# Patient Record
Sex: MALE | Race: OTHER
[De-identification: names, ages, dates, MRNs, and addresses within clinical notes are randomized per-mention and may not be internally consistent; named-entity substitution may affect disease eponyms.]

---

## 2019-02-27 ENCOUNTER — HOSPITAL ENCOUNTER (OUTPATIENT)
Dept: HOSPITAL 88 - OR | Age: 34
Discharge: HOME | End: 2019-02-27
Attending: UROLOGY
Payer: COMMERCIAL

## 2019-02-27 VITALS — SYSTOLIC BLOOD PRESSURE: 135 MMHG | DIASTOLIC BLOOD PRESSURE: 93 MMHG

## 2019-02-27 DIAGNOSIS — I10: ICD-10-CM

## 2019-02-27 DIAGNOSIS — N20.1: Primary | ICD-10-CM

## 2019-02-27 DIAGNOSIS — N13.30: ICD-10-CM

## 2019-02-27 PROCEDURE — 74018 RADEX ABDOMEN 1 VIEW: CPT

## 2019-02-27 PROCEDURE — 50590 FRAGMENTING OF KIDNEY STONE: CPT

## 2019-02-27 NOTE — DIAGNOSTIC IMAGING REPORT
Exam: KUB - 2 views



Clinical History: Pre-op for renal stone. 



Comparison: None.



Findings: 

Bowel gas partially obscures visualization of the kidneys. There is a 9 mm

calcification adjacent to the right L3 transverse process. There is a 3 mm

calcification projecting over the left mid kidney and a 3 mm calcification

projecting over the left lower kidney.



Nonobstructive bowel gas pattern. No acute osseous abnormality.



Impression:

A 9 mm calcification adjacent to the right L3 transverse process could

represent a ureteral stone in the appropriate clinical setting.



Calcifications measuring 3 mm overlying the left mid and lower kidney may

represent stones.



Signed by: Dr. Lori Romo MD on 2/27/2019 1:16 PM

## 2019-02-27 NOTE — OPERATIVE REPORT
DATE OF PROCEDURE:  02/27/2019

 

SURGEON:  Milton Boyd MD

 

PREOPERATIVE DIAGNOSIS:  Right ureteral calculus.

 

POSTOPERATIVE DIAGNOSIS:  Right ureteral calculus.

 

PROCEDURES:  

1. Staged right-sided shock-wave lithotripsy.

2. Supervision of fluoroscopy.

 

ANESTHESIA:  General.

 

ESTIMATED BLOOD LOSS:  Minimal.

 

COMPLICATIONS:  None.

 

INDICATIONS:  Mr. Peters is a very pleasant 33-year-old-male with a history of 
right

ureteral calculus.  He and I had a long discussion regarding the alternatives, 
the risks

and benefits, including doing nothing, stent placement, shock wave lithotripsy,

percutaneous surgery, open surgery.  The patient understands the options, 
alternatives,

the risks and benefits, and elected to proceed with shock-wave lithotripsy.  
Should this

fail will stent at later time. he had elected to proceed. 

 

PROCEDURE IN DETAIL:  After informed consent was obtained, the patient was taken
to the

operating suite, placed supine on the operating table, underwent general 
anesthesia by

the service.  The stone was localized in the xyz plane.  A total 3000 shocks 

were delivered to the stone.  The patient tolerated the procedure well and was

transported to the recovery room in excellent condition. 

 No complications noted

 

 

______________________________

Milton Boyd MD

 

ES/MODL

D:  02/27/2019 14:14:11

T:  02/27/2019 21:26:54

Job #:  [id number]

 

MTDD

## 2019-03-17 ENCOUNTER — HOSPITAL ENCOUNTER (INPATIENT)
Dept: HOSPITAL 88 - MED/SURG | Age: 34
LOS: 2 days | Discharge: HOME | DRG: 661 | End: 2019-03-19
Attending: UROLOGY | Admitting: UROLOGY
Payer: COMMERCIAL

## 2019-03-17 VITALS — SYSTOLIC BLOOD PRESSURE: 121 MMHG | DIASTOLIC BLOOD PRESSURE: 71 MMHG

## 2019-03-17 VITALS — BODY MASS INDEX: 29.25 KG/M2 | WEIGHT: 182.02 LBS | HEIGHT: 66 IN

## 2019-03-17 DIAGNOSIS — N23: ICD-10-CM

## 2019-03-17 DIAGNOSIS — R31.29: ICD-10-CM

## 2019-03-17 DIAGNOSIS — D72.829: ICD-10-CM

## 2019-03-17 DIAGNOSIS — I10: ICD-10-CM

## 2019-03-17 DIAGNOSIS — N13.2: Primary | ICD-10-CM

## 2019-03-17 DIAGNOSIS — I95.9: ICD-10-CM

## 2019-03-17 PROCEDURE — 80053 COMPREHEN METABOLIC PANEL: CPT

## 2019-03-17 PROCEDURE — 36415 COLL VENOUS BLD VENIPUNCTURE: CPT

## 2019-03-17 PROCEDURE — 74420 UROGRAPHY RTRGR +-KUB: CPT

## 2019-03-17 PROCEDURE — 85025 COMPLETE CBC W/AUTO DIFF WBC: CPT

## 2019-03-17 PROCEDURE — 87086 URINE CULTURE/COLONY COUNT: CPT

## 2019-03-18 VITALS — SYSTOLIC BLOOD PRESSURE: 117 MMHG | DIASTOLIC BLOOD PRESSURE: 63 MMHG

## 2019-03-18 VITALS — DIASTOLIC BLOOD PRESSURE: 91 MMHG | SYSTOLIC BLOOD PRESSURE: 137 MMHG

## 2019-03-18 VITALS — DIASTOLIC BLOOD PRESSURE: 83 MMHG | SYSTOLIC BLOOD PRESSURE: 138 MMHG

## 2019-03-18 VITALS — SYSTOLIC BLOOD PRESSURE: 121 MMHG | DIASTOLIC BLOOD PRESSURE: 71 MMHG

## 2019-03-18 VITALS — DIASTOLIC BLOOD PRESSURE: 74 MMHG | SYSTOLIC BLOOD PRESSURE: 111 MMHG

## 2019-03-18 LAB
ALBUMIN SERPL-MCNC: 3.6 G/DL (ref 3.5–5)
ALBUMIN/GLOB SERPL: 1.1 {RATIO} (ref 0.8–2)
ALP SERPL-CCNC: 60 IU/L (ref 40–150)
ALT SERPL-CCNC: 30 IU/L (ref 0–55)
ANION GAP SERPL CALC-SCNC: 11.5 MMOL/L (ref 8–16)
BASOPHILS # BLD AUTO: 0.1 10*3/UL (ref 0–0.1)
BASOPHILS NFR BLD AUTO: 0.4 % (ref 0–1)
BUN SERPL-MCNC: 17 MG/DL (ref 7–26)
BUN/CREAT SERPL: 14 (ref 6–25)
CALCIUM SERPL-MCNC: 8.7 MG/DL (ref 8.4–10.2)
CHLORIDE SERPL-SCNC: 107 MMOL/L (ref 98–107)
CO2 SERPL-SCNC: 27 MMOL/L (ref 22–29)
DEPRECATED NEUTROPHILS # BLD AUTO: 9.9 10*3/UL (ref 2.1–6.9)
EGFRCR SERPLBLD CKD-EPI 2021: > 60 ML/MIN (ref 60–?)
EOSINOPHIL # BLD AUTO: 0 10*3/UL (ref 0–0.4)
EOSINOPHIL NFR BLD AUTO: 0.3 % (ref 0–6)
ERYTHROCYTE [DISTWIDTH] IN CORD BLOOD: 12.4 % (ref 11.7–14.4)
GLOBULIN PLAS-MCNC: 3.3 G/DL (ref 2.3–3.5)
GLUCOSE SERPLBLD-MCNC: 103 MG/DL (ref 74–118)
HCT VFR BLD AUTO: 37.3 % (ref 38.2–49.6)
HGB BLD-MCNC: 12.6 G/DL (ref 14–18)
LYMPHOCYTES # BLD: 1.8 10*3/UL (ref 1–3.2)
LYMPHOCYTES NFR BLD AUTO: 14.4 % (ref 18–39.1)
MCH RBC QN AUTO: 29.9 PG (ref 28–32)
MCHC RBC AUTO-ENTMCNC: 33.8 G/DL (ref 31–35)
MCV RBC AUTO: 88.4 FL (ref 81–99)
MONOCYTES # BLD AUTO: 1 10*3/UL (ref 0.2–0.8)
MONOCYTES NFR BLD AUTO: 7.4 % (ref 4.4–11.3)
NEUTS SEG NFR BLD AUTO: 77.2 % (ref 38.7–80)
PLATELET # BLD AUTO: 337 X10E3/UL (ref 140–360)
POTASSIUM SERPL-SCNC: 4.5 MMOL/L (ref 3.5–5.1)
RBC # BLD AUTO: 4.22 X10E6/UL (ref 4.3–5.7)
SODIUM SERPL-SCNC: 141 MMOL/L (ref 136–145)

## 2019-03-18 RX ADMIN — CEFTRIAXONE SCH MLS/HR: 100 INJECTION, POWDER, FOR SOLUTION INTRAVENOUS at 17:39

## 2019-03-18 NOTE — NUR
ASSESSMENT DONE.AMBULATES.VOIDED.BLOODY URINE NOTED.RESTING COMFORTABLY IN THE BED.PHONE AND 
CALL LIGHT WITHIN REACH.

## 2019-03-18 NOTE — NUR
Patient started on PCA morphine. Bolus of 3mg given and patient tolerated well for pain 
level of 4 to abdomin.

## 2019-03-18 NOTE — NUR
Patient c/o nausea. Nothing ordered for nausea. Called Dr Boyd to get orders. Dr refused 
nausea med due to patients kidney status. Informed patient that Dr will be in to see him 
soon.

## 2019-03-19 VITALS — SYSTOLIC BLOOD PRESSURE: 109 MMHG | DIASTOLIC BLOOD PRESSURE: 60 MMHG

## 2019-03-19 VITALS — SYSTOLIC BLOOD PRESSURE: 124 MMHG | DIASTOLIC BLOOD PRESSURE: 80 MMHG

## 2019-03-19 VITALS — DIASTOLIC BLOOD PRESSURE: 97 MMHG | SYSTOLIC BLOOD PRESSURE: 152 MMHG

## 2019-03-19 PROCEDURE — BT141ZZ FLUOROSCOPY OF KIDNEYS, URETERS AND BLADDER USING LOW OSMOLAR CONTRAST: ICD-10-PCS | Performed by: UROLOGY

## 2019-03-19 PROCEDURE — 0T778ZZ DILATION OF LEFT URETER, VIA NATURAL OR ARTIFICIAL OPENING ENDOSCOPIC: ICD-10-PCS | Performed by: UROLOGY

## 2019-03-19 RX ADMIN — CEFTRIAXONE SCH MLS/HR: 100 INJECTION, POWDER, FOR SOLUTION INTRAVENOUS at 05:35

## 2019-03-19 NOTE — DISCHARGE SUMMARY
ADMITTING DIAGNOSIS:  Ureterolithiasis.

 

DISCHARGE DIAGNOSIS:  Ureterolithiasis.

 

PROCEDURES:  Cystoscopy, retrograde pyelogram, stent placement.

 

HOSPITAL COURSE:  The patient was admitted to the hospital.  History and physical

examination consisting of right renal colic, was found to have an obstructing 1 cm right

ureteral stone.  He underwent stent placement for postoperative pain control.

Concomitant factors were leukocytosis, hypotension, hypertension, colic, hematuria. 

 

DISPOSITION:  He is discharged to home on a regular diet.  Light activity.

 

DISCHARGE MEDICATIONS:  Per reconciliation sheet.

 

FOLLOWUP:  He will follow up in two weeks for definitive management of his ureteral

calculi. 

 

 

 

______________________________

MD RODNEY Carney/MODL

D:  03/19/2019 07:32:28

T:  03/19/2019 13:46:40

Job #:  [id number]

## 2019-03-19 NOTE — NUR
Patient discharged from facility to home. Patient assisted out via staff. Refused wheelchair 
assistance but staff ambulated with him. Reviewed discharge paperwork, follow up appts and 
RX's given.

## 2019-03-19 NOTE — OPERATIVE REPORT
DATE OF PROCEDURE:  03/18/2019

 

SURGEON:  Milton Boyd MD

 

PREOPERATIVE DIAGNOSES:  

1. Right-sided hydronephrosis.

2. Microscopic hematuria.

 

POSTOPERATIVE DIAGNOSES:  

1. Right-sided hydronephrosis.

2. Microscopic hematuria.

 

PROCEDURES:  

1. Cystourethroscopy with left ureteral catheterization and left retrograde 
pyelogram

(separate procedure for diagnosis of microscopic hematuria). 

2. Cystourethroscopy with insertion of right indwelling stent (entirely separate

procedure) for right hydronephrosis. 

3. Supervision on fluoroscopy.

4. Interpretation of retrograde pyelography.

 

ANESTHESIA:  General.

 

ESTIMATED BLOOD LOSS:  Minimal.

 

COMPLICATION:  None.

 

INDICATION:  Mr. Peters is a very pleasant 33-year-old male with a 1 cm 
obstructing

stone.  I had a long discussion about alternatives, risks, and benefits, 
including doing

nothing, stent placement, percutaneous surgery, and open surgery.  He voiced

understanding of the options, the alternatives, the risks, and benefits and 
elected to

proceed.  He voiced explicit understanding that stent is a temporary indwelling 
device

and it must be removed Failure to do so could lead to encrustation, infection, 
inflamation, atrophy loss of kidney renal failure even death.  He elects to 
proceed. 

 

PROCEDURE IN DETAIL:  After informed consent was obtained, the patient was taken
to the

operative suite.  He was placed supine on the operating table and he underwent 
general

anesthesia by Anesthesia Service.  Placed in dorsal lithotomy position.  
Sterilely

prepped and draped in a standard fashion for cystoscopy.  A 21-[place] cystoscope

inserted per urethra.  Normal urethra was noted.  UOs catheterized with a

5-[place] open-ended catheter.  Retrograde pyelogram was performed, revealing 1 
cm distal

ureteral calculus on the right side.  Normal left retrograde pyelogram.  
Ureteral stent

was deployed in the right with coil in the renal pelvis and a coil in the 
bladder.  The

patient's bladder was drained.  He was awakened from anesthesia and transported 
to the

recovery room in excellent condition. 

 

Supervision of fluoroscopy, interpretation of retrograde pyelography:  I was 
present for

the entire procedure and I supervised the use of fluoroscopy as no radiologist 
was

present.  Attention was turned towards the left and right ureters, which were

catheterized with a 5-[place] open-ended catheter   On the right of over 1 cm

distal ureteral calculus with proximal 

hydronephrosis.  Left normal. Postoperative views in the right side revealed 
stent in adequate

position.  

 

 

 

______________________________

MD RODNEY Carney/TREV

D:  03/19/2019 14:13:59

T:  03/19/2019 23:11:53

Job #:  [id number]

 

[email or web address]

## 2019-03-19 NOTE — NUR
Patient is AAox3. patient is post op stent placement for kidney stones. No c/o pain at this 
time. Lung fields clear to auscultation. Bowel sounds present x4. Ambulates on his own with 
no s/s of distress noted

## 2019-04-03 ENCOUNTER — HOSPITAL ENCOUNTER (OUTPATIENT)
Dept: HOSPITAL 88 - OR | Age: 34
Discharge: HOME | End: 2019-04-03
Attending: UROLOGY
Payer: COMMERCIAL

## 2019-04-03 VITALS — SYSTOLIC BLOOD PRESSURE: 121 MMHG | DIASTOLIC BLOOD PRESSURE: 85 MMHG

## 2019-04-03 DIAGNOSIS — Z46.6: ICD-10-CM

## 2019-04-03 DIAGNOSIS — F17.210: ICD-10-CM

## 2019-04-03 DIAGNOSIS — K21.9: ICD-10-CM

## 2019-04-03 DIAGNOSIS — Z87.442: ICD-10-CM

## 2019-04-03 DIAGNOSIS — N13.2: Primary | ICD-10-CM

## 2019-04-03 DIAGNOSIS — F41.9: ICD-10-CM

## 2019-04-03 DIAGNOSIS — R31.29: ICD-10-CM

## 2019-04-03 DIAGNOSIS — I10: ICD-10-CM

## 2019-04-03 PROCEDURE — 52353 CYSTOURETERO W/LITHOTRIPSY: CPT

## 2019-04-03 PROCEDURE — 74420 UROGRAPHY RTRGR +-KUB: CPT

## 2019-04-03 NOTE — XMS REPORT
Patient Summary Document

                             Created on: 2019



TAMIE UMANZOR

External Reference #: [id number]

: 1985

Sex: Male



Demographics







                          Address                   [address] [place] [address]

 

                          Home Phone                [phone]

 

                          Preferred Language        Unknown

 

                          Marital Status            Unknown

 

                          Christian Affiliation     Unknown

 

                          Race                      Unknown

 

                          Ethnic Group              Unknown





Author







                          Author                    [facility]

 

                          Address                   Unknown

 

                          Phone                     Unavailable







Care Team Providers







                    Care Team Member Name    Role                Phone

 

                    ERWIN DYKES    Unavailable         Unavailable







Problems

This patient has no known problems.



Allergies, Adverse Reactions, Alerts

This patient has no known allergies or adverse reactions.



Medications

This patient has no known medications.



Results







           Test Description    Test Time    Test Comments    Text Results    Atomic Results    Result

 Comments

 

                ABDOMEN-1VIEW (KUB)    2019 13:12:00                                                       

                                                   [place] [address]      Patient Name: TAMIE UMANZOR                                   
MR #: [id number]                     : 1985                            
      Age/Sex: 33/M  Acct #: [id number]                              Req #: 
[id number]  Adm Physician:                                                      
Ordered by: ERWIN DYKES MD                            Report #: [id number]   
    Location: OR                                      Room/Bed:                 
   
___________________________________________________________________________________________________
   Procedure: [id number] DX/ABDOMEN-1VIEW (KUB)  Exam Date: 19             
              Exam Time: 1215                                              
REPORT STATUS: Signed    Exam: KUB - 2 views      Clinical History: Pre-op for r
enal stone.       Comparison: None.      Findings:    Bowel gas partially 
obscures visualization of the kidneys. There is a 9 mm   calcification adjacent 
to the right L3 transverse process. There is a 3 mm   calcification projecting 
over the left mid kidney and a 3 mm calcification   projecting over the left 
lower kidney.      Nonobstructive bowel gas pattern. No acute osseous 
abnormality.      Impression:   A 9 mm calcification adjacent to the right L3 
transverse process could   represent a ureteral stone in the appropriate 
clinical setting.      Calcifications measuring 3 mm overlying the left mid and 
lower kidney may   represent stones.      Signed by: Dr. Mir Khan MD on 
2019 1:16 PM        Dictated By: MIR KHAN MD  Electronically Signed By: 
MIR KHAN MD on 19 1313  Transcribed By: OSMANY on 19 1316       
COPY TO:   ERWIN DYKES MD

## 2019-04-03 NOTE — XMS REPORT
Clinical Summary

                             Created on: 2019



Margy Junior

External Reference #: [id number]

: 1985

Sex: Male



Demographics







                          Address                   [address] [place]  [address]

 

                          Home Phone                [phone]

 

                          Preferred Language        English

 

                          Marital Status            

 

                          Oriental orthodox Affiliation     [id number]

 

                          Race                      

 

                          Ethnic Group              Non-





Author







                          Author                    [facility]

 

                          Organization              [facility]

 

                          Address                   Unknown

 

                          Phone                     Unavailable







Support







                Name            Relationship    Address         Phone

 

                Shira Junior    [employer]            Unknown         [phone]

 

                ZANDER UMANZOR      [employer]            Unknown         [phone]







Care Team Providers







                    Care Team Member Name    Role                Phone

 

                    System, Provider Not In MD    PCP                 Unavailable







Allergies

No Known Allergies



Medications







                          End Date                  Status



              Medication     Sig          Dispensed     Refills      Start  



                                         Date  

 

                          2018                



              acetaminophen-codeine     Take 1-2     15 tablet     0              



                     (TYLENOL WITH CODEINE #3)     tablets by          8  



                           300-30 mg per tablet      mouth every 6     



                                         (six) hours     



                                         as needed for     



                                         moderate pain     



                                         for up to 3     



                                         days.     

 

                          2019                



              ondansetron ODT (ZOFRAN     Take 1 tablet     15 tablet     0              



                     ODT) 4 MG disintegrating     (4 mg total)        8  



                           tablet                    by mouth     



                                         every 8     



                                         (eight) hours     



                                         as needed for     



                                         nausea or     



                                         vomiting for     



                                         up to 30     



                                         days.     

 

                          2018                



              ciprofloxacin (CIPRO) 500     Take 1 tablet     14 tablet     0              



                     MG tablet           (500 mg             8  



                                         total) by     



                                         mouth 2 (two)     



                                         times a day     



                                         for 7 days.     

 

                          2018                



              tamsulosin (FLOMAX) 0.4     Take 1       7 capsule     0              



                     mg capsule          capsule (0.4        8  



                                         mg total) by     



                                         mouth daily     



                                         for 7 days.     







Active Problems





Not on file



Encounters







                          Care Team                 Description



                     Date                Type                Specialty  

 

                                        



Anthony Gongora MD Teter, Marleni Love PA-C           Nephrolithiasis (Primary Dx)



                     2018          Emergency           Emergency Medicine  

 

                                                     



                           2018                Travel   



after 2018



Social History







                                        Date



                 Tobacco Use     Types           Packs/Day       Years Used 

 

                                         



                           Light Tobacco Smoker      Cigarettes   

 

    



                                         Smokeless Tobacco: Never   



                                         Used   









                                        Comments: "ocassional"









   



                 Alcohol Use     Drinks/Week     oz/Week         Comments

 

   



                                         No   









 



                           Sex Assigned at Birth     Date Recorded

 

 



                                         Not on file 









                                        Industry



                           Job Start Date            Occupation 

 

                                        Not on file



                           Not on file               Not on file 









                                        Travel End



                           Travel History            Travel Start 

 





                                         No recent travel history available.







Last Filed Vital Signs







                                        Time Taken



                           Vital Sign                Reading 

 

                                        2018 11:50 AM CST



                           Blood Pressure            134/83 

 

                                        2018 11:50 AM CST



                           Pulse                     68 

 

                                        2018 11:50 AM CST



                           Temperature               36.7 C (98 F) 

 

                                        2018 11:50 AM CST



                           Respiratory Rate          15 

 

                                        2018 11:50 AM CST



                           Oxygen Saturation         98% 

 

                                        -



                           Inhaled Oxygen            - 



                                         Concentration  

 

                                        2018 10:23 AM CST



                           Weight                    78.5 kg (173 lb) 

 

                                        2018 10:23 AM CST



                           Height                    167.6 cm (5' 6") 

 

                                        2018 10:23 AM CST



                           Body Mass Index           27.92 







Plan of Treatment







   



                 Health Maintenance     Due Date        Last Done       Comments

 

   



                           INFLUENZA VACCINE         2018  







Procedures







                                        Comments



                 Procedure Name     Priority        Date/Time       Associated Diagnosis 

 

                                        



Results for this procedure are in the results section.



                     CT RENAL STONE PROTOCOL     STAT                2018  



                                         10:48 AM CST  

 

                                        



Results for this procedure are in the results section.



                     ESTIMATED GFR       STAT                2018  



                                         10:29 AM CST  

 

                                        



Results for this procedure are in the results section.



                     URINALYSIS SCREEN AND     STAT                2018  



                           MICROSCOPY, WITH REFLEX      10:29 AM CST  



                                         TO CULTURE    

 

                                        



Results for this procedure are in the results section.



                     COMPREHENSIVE METABOLIC     STAT                2018  



                           PANEL                     10:29 AM CST  

 

                                        



Results for this procedure are in the results section.



                     HC COMPLETE BLD COUNT     STAT                2018  



                           W/AUTO DIFF               10:29 AM CST  

 

                                        



Results for this procedure are in the results section.



                     GRAM STAIN          STAT                2018  



                                         10:29 AM CST  

 

                                        



Results for this procedure are in the results section.



                     URINE CULTURE       STAT                2018  



                                         10:29 AM CST  



after 2018



Results

* CT Renal Stone Protocol (2018 10:48 AM CST)





 



                           Narrative                 Performed At

 

 



                           EXAMINATION:CT RENAL STONE PROTOCOL     [facility]



                                         CLINICAL HISTORY:Flank painstone disease suspected 



                                         TECHNIQUE:Multiple axial CT images of the abdomen and pelvis are obtained 



                                         without the use of intravenous contrast. Coronal and sagittal 3-D 



                                         reconstructions are obtained. 



                                         CT scans are performed using radiation dose reduction techniques.Technical 





                                         factors are evaluated and adjusted to ensure appropriate moderation of 



                                         exposure.Automated dose management technology is applied to adjust radiation

 



                                         exposure while achieving a 



                                         diagnostic quality image. 



                                         COMPARISON:2015 



                                         FINDINGS: 



                                         Abdomen: 



                                         The evaluation of the solid organs is limited without the use of intravenous 



                                         contrast. 



                                         The visualized lower lung zones are clear. 



                                         The gallbladder does not have any wall thickening. There is no pericholecystic 





                                         fluid.. 



                                         The CT appearance of the liver, spleen, pancreas and adrenal glands is 



                                         unremarkable . 



                                         The abdominal aorta has no aneurysmal dilatation. There is no retroperitoneal 



                                         adenopathy. 



                                         The left kidney does not have any stones or any hydronephrosis. 



                                         The right kidney demonstrates moderate hydronephrosis. Perinephric stranding is

 



                                         present. There are no stones seen within the right kidney. 



                                         There is a 8 mm stone seen within the proximal right ureter. 



                                         CT Pelvis: 



                                         There is no evidence of any pneumoperitoneum. The appendix is unremarkable. The

 



                                         evaluation of the GI tract is limited without any oral contrast. 



                                         The colon does not have any focal inflammatory change. There is no bowel wall 



                                         thickening. The bladder does not demonstrate any masses. 



                                         IMPRESSION: 



                                         1. There is a 8 mm stone seen within the proximal right ureter. 



                                         2. The right kidney has moderate hydronephrosis. There are no stones seen within

 



                                         the right kidney. 



                                         3. The left kidney does not have any stones or any hydronephrosis. 



                                         4. The appendix is unremarkable. 



                                         5. There is no bowel obstruction nor any dilated loops of bowel. 



                                         [id number] 









                                        Procedure Note

 

                                        



Hm Interface, Radiology Results Incoming - 2018 11:15 AM CST



EXAMINATION:  CT RENAL STONE PROTOCOL



CLINICAL HISTORY:  Flank pain  stone disease suspected



TECHNIQUE:  Multiple axial CT images of the abdomen and pelvis are obtained 
without the use of intravenous contrast. Coronal and sagittal 3-D 
reconstructions are obtained.



CT scans are performed using radiation dose reduction techniques.  Technical 
factors are evaluated and adjusted to ensure appropriate moderation of exposure.
 Automated dose management technology is applied to adjust radiation exposure 
while achieving a 

diagnostic quality image.



COMPARISON:  2015



FINDINGS:



Abdomen:

The evaluation of the solid organs is limited without the use of intravenous 
contrast.



The visualized lower lung zones are clear.



The gallbladder does not have any wall thickening. There is no pericholecystic 
fluid..



The CT appearance of the liver, spleen, pancreas and adrenal glands is 
unremarkable .



The abdominal aorta has no aneurysmal dilatation. There is no retroperitoneal 
adenopathy.



The left kidney does not have any stones or any hydronephrosis.



The right kidney demonstrates moderate hydronephrosis. Perinephric stranding is 
present. There are no stones seen within the right kidney.



There is a 8 mm stone seen within the proximal right ureter.





CT Pelvis:

 There is no evidence of any pneumoperitoneum. The appendix is unremarkable. The
evaluation of the GI tract is limited without any oral contrast.



The colon does not have any focal inflammatory change. There is no bowel wall 
thickening. The bladder does not demonstrate any masses.







IMPRESSION:

                                        1. There is a 8 mm stone seen within the proximal right ureter.

                                        2. The right kidney has moderate hydronephrosis. There are no stones seen within

 the right kidney.

                                        3. The left kidney does not have any stones or any hydronephrosis.

                                        4. The appendix is unremarkable.

                                        5. There is no bowel obstruction nor any dilated loops of bowel.













[id number]











   



                 Performing Organization     Address         City/State/Zipcode     Phone Number

 

   



                      [facility]          [address] [place] [address] 





* Urinalysis screen and microscopy, with reflex to culture (2018 10:29 AM 
  CST)





   



                 Component       Value           Ref Range       Performed At

 

   



                     Specimen site       Catheterized        [facility]

 

   



                     Color, UA           Yellow              [facility]

 

   



                     Appearance, UA      Cloudy              [facility]

 

   



                 Specific gravity, UA     1.024           1.001 - 1.035     [facility]

 

   



                 pH, UA          7.0             5.0 - 8.5       [facility]

 

   



                 Protein, UA     1+ (A)          Negative        [facility]

 

   



                 Glucose, UA     Negative        Negative        [facility]

 

   



                 Ketones, UA     Negative        Negative        [facility]

 

   



                 Bilirubin, UA     Negative        Negative        [facility]

 

   



                 Blood, UA       Small (A)       Negative        [facility]

 

   



                 Nitrite, UA     Negative        Negative        [facility]

 

   



                 Urobilinogen, UA     Negative        <2.0            [facility]

 

   



                 Leukocyte esterase, UA     Negative        Negative        [facility]

 

   



                 Epithelial cells, UA     Few             /HPF            [facility]

 

   



                 WBC, UA         6-10 (H)        0 - 1 /HPF      [facility]

 

   



                 RBC, UA         11-20 (H)       0 - 5 /HPF      [facility]

 

   



                 Bacteria, UA     None seen       None seen       [facility]

 

   



                     Yeast, UA           None seen           [facility]

 

   



                     Yeast with pseudohyphae,     None seen           [facility]

 

   



                     Amorphous crystals     Few                 [facility]

 

   



                 Hyaline casts, UA     3-5             /LPF            [facility]













                                         Specimen

 





                                         Urine









   



                 Performing Organization     Address         City/[facility]     Phone Number

 

   



                     [facility] DEPARTMENT [address] [place] [address] 



                                         PATHOLOGY AND GENOMIC   



                                         MEDICINE   

 

   



                     [address]      [address]   





* Estimated GFR (2018 10:29 AM CST)





   



                 Component       Value           Ref Range       Performed At

 

   



                 Estimated GFR     86              mL/min/1.73 m2     [facility]



                           Comment:                  [facility]



                                         CatergoryUnitsInte  



                                         rpretation  



                                         G1  



                                         >=90 Normal or high  



                                         G2  



                                         60-89Mildly decreased  



                                         [id number]  



                                         Mildly to moderately  



                                         decreased  



                                         [id number]  



                                         Moderately to severely  



                                         decreased  



                                         G4  



                                         15-29Severely  



                                         decreased  



                                         G5  



                                         <15Kidney failure  



                                         The eGFR was calculated using  



                                         the Chronic Kidney Disease  



                                         Epidemiology Collaboration  



                                         (CKD-EPI) equation.  



                                         Interpretation is based on  



                                         recommendations of the  



                                         National Kidney  



                                         Foundation-Kidney Disease  



                                         Outcomes Quality  



                                         Initiative (NKF-KDOQI)  



                                         published in 2014.  













                                         Specimen

 





                                         Plasma specimen









   



                 Performing Organization     Address         City/State/[facility] Cent[facility]     Phone Number

 

   



                     [address] [place] [address] 



                                         PATHOLOGY AND GENOMIC   



                                         MEDICINE   

 

   



                     [address]      [address]   





* Gram stain (2018 10:29 AM CST)





   



                 Component       Value           Ref Range       Performed At

 

   



                     Gram stain result     No WBC's or organisms seen.      [facility]



                           Comment:                  HOSPITAL



                                         Specimen Information  



                                         Specimen Source: Urine  



                                         Specimen Site: Catheterized  













                                         Specimen

 





                                         Urine - Catheterized









   



                 Performing Organization     Address         City/[facility]/Zipcode     Phone Number

 

   



                     [facility] [place] [address] 



                                         PATHOLOGY AND GENOMIC   



                                         MEDICINE   

 

   



                     [address]   





* CBC with platelet and differential (2018 10:29 AM CST)





   



                 Component       Value           Ref Range       Performed At

 

   



                 WBC             13.89 (H)       4.50 - 11.00 k/uL     [facility]

 

   



                 RBC             4.85            4.40 - 6.00 m/uL     [facility]

 

   



                 HGB             14.3            14.0 - 18.0 g/dL     [facility]

 

   



                 HCT             42.7            41.0 - 51.0 %     [facility]

 

   



                 MCV             88.0            82.0 - 100.0 fL     [facility]

 

   



                 MCH             29.5            27.0 - 34.0 pg     [facility]

 

   



                 MCHC            33.5            31.0 - 37.0 g/dL     [facility]

 

   



                 RDW - SD        38.5            37.0 - 55.0 fL     [facility]

 

   



                 MPV             8.7 (L)         8.8 - 13.2 fL     [facility]

 

   



                 Platelet count     350             150 - 400 k/uL     [facility]

 

   



                 Nucleated RBC     0.00            /100 WBC        [facility]

 

   



                 Neutrophils     70.2 (H)        39.0 - 69.0 %     [facility]

 

   



                 Lymphocytes     21.6 (L)        25.0 - 45.0 %     [facility]

 

   



                 Monocytes       6.7             0.0 - 10.0 %     [facility]

 

   



                 Eosinophils     0.8             0.0 - 5.0 %     [facility]

 

   



                 Basophils       0.4             0.0 - 1.0 %     [facility]













                                         Specimen

 





                                         Blood









   



                 Performing Organization     Address         City/[facility]     Phone Number

 

   



                     [facility] DEPARTMENT      [id number] [place] [address]      [place] [address] 



                                         PATHOLOGY AND GENOMIC   



                                         MEDICINE   

 

   



                     [address]      [address]   





* Urine culture (2018 10:29 AM CST)





   



                 Component       Value           Ref Range       Performed At

 

   



                     Urine culture isolate     Mixed patricia <=10-3 col/cc      [facility]



                           Comment:                  HOSPITAL



                                         Specimen Information  



                                         Specimen Source: Urine  



                                         Specimen Site: Catheterized  













                                         Specimen

 





                                         Urine - Catheterized









   



                 Performing Organization     Address         City/[facility]     Phone Number

 

   



                     [facility] [place] [address] 



                                         PATHOLOGY AND GENOMIC   



                                         MEDICINE   

 

   



                     [address]   





* Comprehensive metabolic panel (2018 10:29 AM CST)





   



                 Component       Value           Ref Range       Performed At

 

   



                 Sodium          138             135 - 148 mEq/L     [facility]

 

   



                 Potassium       4.2             3.5 - 5.0 mEq/L     [facility]

 

   



                 Chloride        102             98 - 112 mEq/L     [facility]

 

   



                 CO2             24              24 - 31 mEq/L     [facility]

 

   



                 Anion gap       12@ANIO         7 - 15 mEq/L     [facility]

 

   



                 BUN             24 (H)          6 - 20 mg/dL     [facility]

 

   



                 Creatinine      1.10            0.70 - 1.20 mg/dL     [facility]

 

   



                 Glucose         108 (H)         65 - 99 mg/dL     [facility]

 

   



                 Calcium         10.0            8.3 - 10.2 mg/dL     [facility]

 

   



                 Protein         8.4 (H)         6.3 - 8.3 g/dL     [facility]



                           Comment:                  [facility]



                                         [place]  



                                          4.6-7.0 g/dL  



                                         1  



                                         week  



                                          4.4-7.6 g/dL  



                                         7  



                                         months-1year  



                                         5.1-7.3 g/dL  



                                         1-2  



                                         years5.6-7  



                                         .5 g/dL  



                                         >3  



                                         years6.0-8  



                                         .0 g/dL  



                                           



                                          6.3-8.3 g/dL  

 

   



                 Albumin         4.9             3.5 - 5.0 g/dL     [facility]

 

   



                 A/G ratio       1.4             0.7 - 3.8       [facility]

 

   



                 Alkaline phosphatase     72              40 - 129 U/L     [facility]

 

   



                 AST             20              10 - 50 U/L     [facility]

 

   



                 ALT             18              5 - 50 U/L      [facility]

 

   



                 Total bilirubin     0.7             0.0 - 1.2 mg/dL     [facility]













                                         Specimen

 





                                         Plasma specimen









   



                 Performing Organization     Address         City/State/Zipcode     Phone Number

 

   



                     [facility] DEPARTMENT      [address] [place] [address] 



                                         PATHOLOGY AND GENOMIC   



                                         MEDICINE   

 

   



                     [facility]     [address]      [place] [id number] 



                                         [facility]   





after 2018



Advance Directives





Patient has advance care planning documents on file. For more information, salima banuelos contact:



Suresh Cota



[address] [place] [address]

## 2019-04-04 NOTE — OPERATIVE REPORT
DATE OF PROCEDURE:  04/03/2019

 

SURGEON:  Milton Boyd MD

 

PREOPERATIVE DIAGNOSES:  

1. Right ureteral calculus.

2. Right hydronephrosis.

3. Right indwelling stent.

 

POSTOPERATIVE DIAGNOSES:  

1. Right ureteral calculus.

2. Right hydronephrosis.

3. Right indwelling stent.

 

PROCEDURES:  

1. Cystourethroscopy with complicated removal of right indwelling stent 
(entirely

separate procedure for diagnosis of right indwelling stent). 

2. Right-sided ureteroscopy with laser lithotripsy (entirely separate procedure 
for

ridding the patient of large obstructing proximal right ureteral calculi). 

3. Supervision of ureteroscopy.

4. Supervision of fluoroscopy for stent removal portions.

5. Interpretation of anterior pyelography.

 

ANESTHESIA:  General.

 

ESTIMATED BLOOD LOSS:  Minimal.

 

COMPLICATIONS:  None.

 

INDICATIONS FOR PROCEDURE:  Mr. Peters is a very pleasant 33-year-old male with 
a

history of obstructing ureteral calculi and ureteral stricture, now presenting 
for

ureteroscopy and stent removal.  He voiced understanding of the options, 
alternatives,

risks and benefits and he elected to proceed. 

 

PROCEDURE IN DETAIL:  After informed consent was obtained, the patient was taken
to the

operative suite.  He was placed supine on the operating table, he underwent 
general

anesthesia by the anesthesia service.  He was placed in the dorsal lithotomy 
position,

sterilely prepped and draped in a standard fashion for cystoscopy.  A 21-Hungarian

cystoscope was inserted per urethra.  Normal urethra was noted.  The stent was 
seen

across the ureteral orifice and was grasped.  An attempt was made to insert a 
guidewire,

but this has failed.  Guidewire was inserted under direct vision and seen to 
coil at the

level of the renal pelvis on the fluoroscopy.  The ureteroscope was driven to 
level of

obstructing ureteral stone.  Fluoroscopic image was taken, image #1.  Utilizing 
the 365

micron laser fiber, the 6-7 mm stone was blasted into powder.  There were no 
fragments

remaining that could be seen fluoroscopically.  A retrograde pyelogram was again

performed showing prompt drainage.  The stone was removed, the ureteroscope was

withdrawn, the safety wire was removed, the bladder was drained and the patient 
was

awakened from anesthesia and transported to recovery room in excellent 
condition. 

 

Supervision of fluoroscopy, interpretation of retrograde pyelography:  I was 
present for

the entire procedure and I supervised the use of fluoroscopy as no radiologist 
was

present.  Attention was turned towards the right ureteral orfice which was

catheterized with a ureteroscope.  A retrograde pyelogram was performed 
revealing

dilated collecting system and interim removal of ureteral calculus and stent

 

IMPRESSION:  dilated collecting system and interim removal of ureteral calculus 
and stent

 

 

 

______________________________

MD RODNEY Carney/MODL

D:  04/04/2019 13:27:34

T:  04/04/2019 14:40:33

Job #:  [id number]

 

MTDD